# Patient Record
Sex: FEMALE | Race: WHITE | NOT HISPANIC OR LATINO | Employment: UNEMPLOYED | URBAN - METROPOLITAN AREA
[De-identification: names, ages, dates, MRNs, and addresses within clinical notes are randomized per-mention and may not be internally consistent; named-entity substitution may affect disease eponyms.]

---

## 2019-02-06 ENCOUNTER — HOSPITAL ENCOUNTER (EMERGENCY)
Facility: HOSPITAL | Age: 34
Discharge: HOME | End: 2019-02-06
Attending: EMERGENCY MEDICINE
Payer: MEDICAID

## 2019-02-06 VITALS
HEART RATE: 83 BPM | DIASTOLIC BLOOD PRESSURE: 66 MMHG | RESPIRATION RATE: 16 BRPM | SYSTOLIC BLOOD PRESSURE: 113 MMHG | BODY MASS INDEX: 34.16 KG/M2 | WEIGHT: 205 LBS | OXYGEN SATURATION: 97 % | TEMPERATURE: 99 F | HEIGHT: 65 IN

## 2019-02-06 DIAGNOSIS — R11.2 NON-INTRACTABLE VOMITING WITH NAUSEA, UNSPECIFIED VOMITING TYPE: ICD-10-CM

## 2019-02-06 DIAGNOSIS — Z34.90 PREGNANCY, UNSPECIFIED GESTATIONAL AGE: Primary | ICD-10-CM

## 2019-02-06 DIAGNOSIS — N76.0 BV (BACTERIAL VAGINOSIS): ICD-10-CM

## 2019-02-06 DIAGNOSIS — B96.89 BV (BACTERIAL VAGINOSIS): ICD-10-CM

## 2019-02-06 LAB
ABO + RH BLD: NORMAL
ALBUMIN SERPL BCP-MCNC: 3.6 G/DL
ALP SERPL-CCNC: 50 U/L
ALT SERPL W/O P-5'-P-CCNC: 22 U/L
AMORPH CRY UR QL COMP ASSIST: NORMAL
ANION GAP SERPL CALC-SCNC: 10 MMOL/L
AST SERPL-CCNC: 19 U/L
B-HCG UR QL: POSITIVE
BACTERIA #/AREA URNS AUTO: NORMAL /HPF
BACTERIA GENITAL QL WET PREP: ABNORMAL
BASOPHILS # BLD AUTO: 0.04 K/UL
BASOPHILS NFR BLD: 0.4 %
BILIRUB SERPL-MCNC: 0.3 MG/DL
BILIRUB UR QL STRIP: NEGATIVE
BUN SERPL-MCNC: 8 MG/DL
CALCIUM SERPL-MCNC: 10.2 MG/DL
CHLORIDE SERPL-SCNC: 105 MMOL/L
CLARITY UR REFRACT.AUTO: ABNORMAL
CLUE CELLS VAG QL WET PREP: ABNORMAL
CO2 SERPL-SCNC: 21 MMOL/L
COLOR UR AUTO: YELLOW
CREAT SERPL-MCNC: 0.6 MG/DL
CTP QC/QA: YES
DIFFERENTIAL METHOD: ABNORMAL
EOSINOPHIL # BLD AUTO: 0.1 K/UL
EOSINOPHIL NFR BLD: 0.7 %
ERYTHROCYTE [DISTWIDTH] IN BLOOD BY AUTOMATED COUNT: 12.4 %
EST. GFR  (AFRICAN AMERICAN): >60 ML/MIN/1.73 M^2
EST. GFR  (NON AFRICAN AMERICAN): >60 ML/MIN/1.73 M^2
FILAMENT FUNGI VAG WET PREP-#/AREA: ABNORMAL
GLUCOSE SERPL-MCNC: 86 MG/DL
GLUCOSE UR QL STRIP: NEGATIVE
HCT VFR BLD AUTO: 37 %
HGB BLD-MCNC: 12.7 G/DL
HGB UR QL STRIP: NEGATIVE
IMM GRANULOCYTES # BLD AUTO: 0.1 K/UL
IMM GRANULOCYTES NFR BLD AUTO: 0.9 %
KETONES UR QL STRIP: NEGATIVE
LEUKOCYTE ESTERASE UR QL STRIP: NEGATIVE
LIPASE SERPL-CCNC: 10 U/L
LYMPHOCYTES # BLD AUTO: 1.2 K/UL
LYMPHOCYTES NFR BLD: 11 %
MCH RBC QN AUTO: 32 PG
MCHC RBC AUTO-ENTMCNC: 34.3 G/DL
MCV RBC AUTO: 93 FL
MICROSCOPIC COMMENT: NORMAL
MONOCYTES # BLD AUTO: 0.5 K/UL
MONOCYTES NFR BLD: 5 %
NEUTROPHILS # BLD AUTO: 8.8 K/UL
NEUTROPHILS NFR BLD: 82 %
NITRITE UR QL STRIP: NEGATIVE
NRBC BLD-RTO: 0 /100 WBC
PH UR STRIP: 7 [PH] (ref 5–8)
PLATELET # BLD AUTO: 191 K/UL
PMV BLD AUTO: 11.3 FL
POTASSIUM SERPL-SCNC: 3.7 MMOL/L
PROT SERPL-MCNC: 7.4 G/DL
PROT UR QL STRIP: NEGATIVE
RBC # BLD AUTO: 3.97 M/UL
RBC #/AREA URNS AUTO: 1 /HPF (ref 0–4)
SODIUM SERPL-SCNC: 136 MMOL/L
SP GR UR STRIP: 1.01 (ref 1–1.03)
SPECIMEN SOURCE: ABNORMAL
SQUAMOUS #/AREA URNS AUTO: 4 /HPF
T VAGINALIS GENITAL QL WET PREP: ABNORMAL
URN SPEC COLLECT METH UR: ABNORMAL
WBC # BLD AUTO: 10.75 K/UL
WBC #/AREA VAG WET PREP: ABNORMAL
YEAST GENITAL QL WET PREP: ABNORMAL

## 2019-02-06 PROCEDURE — 83690 ASSAY OF LIPASE: CPT

## 2019-02-06 PROCEDURE — 25000003 PHARM REV CODE 250: Performed by: EMERGENCY MEDICINE

## 2019-02-06 PROCEDURE — 81001 URINALYSIS AUTO W/SCOPE: CPT

## 2019-02-06 PROCEDURE — 81025 URINE PREGNANCY TEST: CPT | Performed by: EMERGENCY MEDICINE

## 2019-02-06 PROCEDURE — 99283 EMERGENCY DEPT VISIT LOW MDM: CPT | Mod: 25

## 2019-02-06 PROCEDURE — 86901 BLOOD TYPING SEROLOGIC RH(D): CPT

## 2019-02-06 PROCEDURE — 87491 CHLMYD TRACH DNA AMP PROBE: CPT

## 2019-02-06 PROCEDURE — 99284 EMERGENCY DEPT VISIT MOD MDM: CPT | Mod: ,,, | Performed by: EMERGENCY MEDICINE

## 2019-02-06 PROCEDURE — 87210 SMEAR WET MOUNT SALINE/INK: CPT

## 2019-02-06 PROCEDURE — 99284 PR EMERGENCY DEPT VISIT,LEVEL IV: ICD-10-PCS | Mod: ,,, | Performed by: EMERGENCY MEDICINE

## 2019-02-06 PROCEDURE — 80053 COMPREHEN METABOLIC PANEL: CPT

## 2019-02-06 PROCEDURE — 85025 COMPLETE CBC W/AUTO DIFF WBC: CPT

## 2019-02-06 RX ORDER — LEVOTHYROXINE SODIUM 200 UG/1
200 TABLET ORAL DAILY
Qty: 14 TABLET | Refills: 0 | Status: SHIPPED | OUTPATIENT
Start: 2019-02-06 | End: 2019-02-20

## 2019-02-06 RX ORDER — CLINDAMYCIN PHOSPHATE 20 MG/G
CREAM VAGINAL NIGHTLY
Qty: 40 G | Refills: 0 | Status: SHIPPED | OUTPATIENT
Start: 2019-02-06

## 2019-02-06 RX ORDER — PREGABALIN 225 MG/1
225 CAPSULE ORAL 2 TIMES DAILY
COMMUNITY

## 2019-02-06 RX ORDER — LEVOTHYROXINE SODIUM 200 UG/1
200 TABLET ORAL DAILY
COMMUNITY

## 2019-02-06 RX ORDER — PREGABALIN 75 MG/1
225 CAPSULE ORAL
Status: COMPLETED | OUTPATIENT
Start: 2019-02-06 | End: 2019-02-06

## 2019-02-06 RX ORDER — PREGABALIN 225 MG/1
225 CAPSULE ORAL DAILY
Qty: 7 CAPSULE | Refills: 0 | Status: SHIPPED | OUTPATIENT
Start: 2019-02-06 | End: 2019-02-13

## 2019-02-06 RX ORDER — DOXYLAMINE SUCCINATE AND PYRIDOXINE HYDROCHLORIDE, DELAYED RELEASE TABLETS 10 MG/10 MG 10; 10 MG/1; MG/1
2 TABLET, DELAYED RELEASE ORAL NIGHTLY
Qty: 20 TABLET | Refills: 0 | Status: SHIPPED | OUTPATIENT
Start: 2019-02-06

## 2019-02-06 RX ADMIN — LEVOTHYROXINE SODIUM 200 MCG: 0.11 TABLET ORAL at 10:02

## 2019-02-06 RX ADMIN — PREGABALIN 225 MG: 75 CAPSULE ORAL at 10:02

## 2019-02-06 RX ADMIN — SODIUM CHLORIDE 1000 ML: 0.9 INJECTION, SOLUTION INTRAVENOUS at 10:02

## 2019-02-06 NOTE — ED NOTES
Patient arrives with complaints of morning sickness x 3 days - last meal this morning caused emesis - last meal without emesis was 4 days ago - nausea consistent x 3 days - low abdominal pain and lower back pain - denies vaginal discharge or pain on urination -

## 2019-02-06 NOTE — ED PROVIDER NOTES
"Encounter Date: 2019    SCRIBE #1 NOTE: I, Alfredito Sahu, am scribing for, and in the presence of,  Dr. Montez. I have scribed the following portions of the note - Other sections scribed: HPI, ROS, PE, MDM.       History     Chief Complaint   Patient presents with    Abdominal Pain     Pt c/o abdominal pain & vomiting since Saturday.  Pt states she is 4 months pregnant-has not seen OB yet.     32 yo F with PMHx of hypothyroidism, chronic back pain,  at 14-0 wga by LMP presents with chief complaint of nausea.  Patient reports nausea x4 days. There has been associated vomiting. Last episode at 8AM today. She previously did have scant streaks of blood in her vomit but none today.   She reports no issues with N/V earlier this pregnancy.  Patient also reports occasional lower abdominal pain. It is described as "sharp, cramping". None at this time.  She denies having contractions or having history of pancreatitis.  Patient is concerned about being out of home medications including Lyrica and Synthroid for the past 3-4 days.  She reports vaginal discharge but none more than usual.  Denies vaginal pain, dysuria, urinary frequency.  Of note, [place], she is visiting  who is here for work.  LNMP was on .        The history is provided by the patient and medical records.     Review of patient's allergies indicates:   Allergen Reactions    Pcn [penicillins] Hives and Itching     History reviewed. No pertinent past medical history.  No past surgical history on file.  History reviewed. No pertinent family history.  Social History     Tobacco Use    Smoking status: Not on file   Substance Use Topics    Alcohol use: Not on file    Drug use: Not on file     Review of Systems   Constitutional: Negative for fever.   HENT: Negative for sore throat.    Respiratory: Negative for shortness of breath.    Cardiovascular: Negative for chest pain.   Gastrointestinal: " Positive for nausea and vomiting.   Genitourinary: Negative for dysuria, frequency, vaginal discharge and vaginal pain.   Musculoskeletal: Negative for back pain.   Skin: Negative for rash.   Neurological: Negative for weakness.   Hematological: Does not bruise/bleed easily.   All other systems reviewed and are negative.      Physical Exam     Initial Vitals [02/06/19 0851]   BP Pulse Resp Temp SpO2   (!) 146/79 97 18 98 °F (36.7 °C) 98 %      MAP       --         Physical Exam    Nursing note and vitals reviewed.  Constitutional: She appears well-developed and well-nourished. She is not diaphoretic. No distress.   HENT:   Head: Normocephalic and atraumatic.   Mouth/Throat: Oropharynx is clear and moist.   Eyes: Conjunctivae and EOM are normal.   Neck: Normal range of motion. Neck supple. No JVD present.   Cardiovascular: Normal rate, regular rhythm, normal heart sounds and intact distal pulses. Exam reveals no gallop and no friction rub.    No murmur heard.  Pulmonary/Chest: Breath sounds normal. No respiratory distress. She has no wheezes. She has no rhonchi. She has no rales. She exhibits no tenderness.   Abdominal: Soft. Bowel sounds are normal. She exhibits no distension and no mass. There is no tenderness. There is no rebound, no guarding and no CVA tenderness.   Gravid   Genitourinary: Right adnexum displays no tenderness and no fullness. Left adnexum displays no tenderness and no fullness. Vaginal discharge (scant) found.   Genitourinary Comments: Cervix is closed     Musculoskeletal: Normal range of motion. She exhibits no tenderness.   Lymphadenopathy:     She has no cervical adenopathy.   Neurological: She is alert and oriented to person, place, and time. She has normal strength. No sensory deficit.   Skin: Skin is warm and dry. Capillary refill takes less than 2 seconds.   Psychiatric: She has a normal mood and affect.         ED Course   Procedures  Labs Reviewed   URINALYSIS, REFLEX TO URINE CULTURE -  Abnormal; Notable for the following components:       Result Value    Appearance, UA Cloudy (*)     All other components within normal limits    Narrative:     Preferred Collection Type->Urine, Clean Catch   CBC W/ AUTO DIFFERENTIAL - Abnormal; Notable for the following components:    RBC 3.97 (*)     MCH 32.0 (*)     Immature Granulocytes 0.9 (*)     Gran # (ANC) 8.8 (*)     Immature Grans (Abs) 0.10 (*)     Gran% 82.0 (*)     Lymph% 11.0 (*)     All other components within normal limits   COMPREHENSIVE METABOLIC PANEL - Abnormal; Notable for the following components:    CO2 21 (*)     Alkaline Phosphatase 50 (*)     All other components within normal limits   VAGINAL SCREEN - Abnormal; Notable for the following components:    Clue Cells, Wet Prep Few (*)     WBC - Vaginal Screen Few (*)     Bacteria - Vaginal Screen Moderate (*)     All other components within normal limits   POCT URINE PREGNANCY - Abnormal; Notable for the following components:    POC Preg Test, Ur Positive (*)     All other components within normal limits   C. TRACHOMATIS/N. GONORRHOEAE BY AMP DNA   LIPASE   URINALYSIS MICROSCOPIC    Narrative:     Preferred Collection Type->Urine, Clean Catch   GROUP & RH          Imaging Results    None          Medical Decision Making:   History:   Old Medical Records: I decided to obtain old medical records.  Initial Assessment:   34 yo F with PMHx of hypothyroidism, chronic back pain,  at 14-0 wga by LMP presents with chief complaint of nausea.   Differential Diagnosis:   My initial differential diagnoses include, but are not limited to: Pregnancy, ectopic pregnancy, threatened , dehydration, electrolyte abnormalities, UTI, infectious issues, hepatitis, pancreatitis.    Clinical Tests:   Lab Tests: Ordered and Reviewed  ED Management:  Bedside ultrasound with active IUP with fetal movement and a heart rate present.  Will obtain serum and urine labs.  Will administer IVF and refill home  medications.  I offered the patient bentyl to be shipped from [facility] but she declined.  Will monitor response to treatment.      Reassessment : Urinalysis negative for infection. Vaginosis screen reveals clue cells.  CBC, CMP, and lipase are WNL.  Pt will be discharged on course of diclegis for nausea and vomiting.  She was able to tolerate PO in the ED.  She will be provided with a course of intravaginal clindomycin for BV.  Refill of home doses of levothyroxine and lyrica.  She has no evidence of severe dehyration.  Nausea and vomiting likely secondary to  pregnancy.  Provided with return precautions.              Scribe Attestation:   Scribe #1: I performed the above scribed service and the documentation accurately describes the services I performed. I attest to the accuracy of the note.    Attending Attestation:           Physician Attestation for Scribe:      Comments: I, Dr. Dmitry Montez, personally performed the services described in this documentation. All medical record entries made by the scribe were at my direction and in my presence.  I have reviewed the chart and agree that the record reflects my personal performance and is accurate and complete. Dmitry Montez MD.  1:54 PM 02/06/2019                 Clinical Impression:   The primary encounter diagnosis was Pregnancy, unspecified gestational age. Diagnoses of Non-intractable vomiting with nausea, unspecified vomiting type and BV (bacterial vaginosis) were also pertinent to this visit.      Disposition:   Disposition: Discharged  Condition: Stable                        Dmitry Montez MD  02/06/19 1354

## 2019-02-07 LAB
C TRACH DNA SPEC QL NAA+PROBE: NOT DETECTED
N GONORRHOEA DNA SPEC QL NAA+PROBE: NOT DETECTED

## 2019-04-18 ENCOUNTER — HOSPITAL ENCOUNTER (EMERGENCY)
Age: 34
Discharge: HOME | End: 2019-04-18
Payer: MEDICAID

## 2019-04-18 ENCOUNTER — APPOINTMENT (OUTPATIENT)
Dept: GENERAL RADIOLOGY | Age: 34
End: 2019-04-18
Payer: MEDICAID

## 2019-04-18 ENCOUNTER — HOSPITAL ENCOUNTER (OUTPATIENT)
Age: 34
Discharge: HOME | End: 2019-04-18
Attending: OBSTETRICS & GYNECOLOGY | Admitting: OBSTETRICS & GYNECOLOGY
Payer: MEDICAID

## 2019-04-18 VITALS
RESPIRATION RATE: 18 BRPM | TEMPERATURE: 97.7 F | HEART RATE: 88 BPM | DIASTOLIC BLOOD PRESSURE: 75 MMHG | SYSTOLIC BLOOD PRESSURE: 119 MMHG

## 2019-04-18 VITALS
HEIGHT: 66 IN | DIASTOLIC BLOOD PRESSURE: 60 MMHG | HEART RATE: 92 BPM | BODY MASS INDEX: 36.16 KG/M2 | SYSTOLIC BLOOD PRESSURE: 111 MMHG | TEMPERATURE: 98 F | WEIGHT: 225 LBS | OXYGEN SATURATION: 99 % | RESPIRATION RATE: 16 BRPM

## 2019-04-18 DIAGNOSIS — M79.89 BILATERAL SWELLING OF FEET: ICD-10-CM

## 2019-04-18 DIAGNOSIS — Z3A.28 28 WEEKS GESTATION OF PREGNANCY: ICD-10-CM

## 2019-04-18 DIAGNOSIS — S93.602A SPRAIN OF LEFT FOOT, INITIAL ENCOUNTER: Primary | ICD-10-CM

## 2019-04-18 PROBLEM — M79.672 FOOT PAIN, LEFT: Status: ACTIVE | Noted: 2019-04-18

## 2019-04-18 LAB
ALBUMIN SERPL-MCNC: 3.6 G/DL (ref 3.5–5.1)
ALP BLD-CCNC: 63 U/L (ref 38–126)
ALT SERPL-CCNC: 17 U/L (ref 11–66)
ANION GAP SERPL CALCULATED.3IONS-SCNC: 13 MEQ/L (ref 8–16)
AST SERPL-CCNC: 11 U/L (ref 5–40)
BACTERIA: ABNORMAL /HPF
BASOPHILS # BLD: 0.3 %
BASOPHILS ABSOLUTE: 0 THOU/MM3 (ref 0–0.1)
BILIRUB SERPL-MCNC: < 0.2 MG/DL (ref 0.3–1.2)
BILIRUBIN DIRECT: < 0.2 MG/DL (ref 0–0.3)
BILIRUBIN URINE: NEGATIVE
BLOOD, URINE: ABNORMAL
BUN BLDV-MCNC: 6 MG/DL (ref 7–22)
CALCIUM SERPL-MCNC: 9 MG/DL (ref 8.5–10.5)
CASTS 2: ABNORMAL /LPF
CASTS UA: ABNORMAL /LPF
CHARACTER, URINE: CLEAR
CHLORIDE BLD-SCNC: 105 MEQ/L (ref 98–111)
CO2: 19 MEQ/L (ref 23–33)
COLOR: YELLOW
CREAT SERPL-MCNC: 0.4 MG/DL (ref 0.4–1.2)
CRYSTALS, UA: ABNORMAL
EOSINOPHIL # BLD: 0.7 %
EOSINOPHILS ABSOLUTE: 0.1 THOU/MM3 (ref 0–0.4)
EPITHELIAL CELLS, UA: ABNORMAL /HPF
ERYTHROCYTE [DISTWIDTH] IN BLOOD BY AUTOMATED COUNT: 12.7 % (ref 11.5–14.5)
ERYTHROCYTE [DISTWIDTH] IN BLOOD BY AUTOMATED COUNT: 43.1 FL (ref 35–45)
GFR SERPL CREATININE-BSD FRML MDRD: > 90 ML/MIN/1.73M2
GLUCOSE BLD-MCNC: 103 MG/DL (ref 70–108)
GLUCOSE URINE: NEGATIVE MG/DL
HCT VFR BLD CALC: 32.8 % (ref 37–47)
HEMOGLOBIN: 11.3 GM/DL (ref 12–16)
IMMATURE GRANS (ABS): 0.25 THOU/MM3 (ref 0–0.07)
IMMATURE GRANULOCYTES: 2.1 %
KETONES, URINE: NEGATIVE
LEUKOCYTE ESTERASE, URINE: NEGATIVE
LYMPHOCYTES # BLD: 11.2 %
LYMPHOCYTES ABSOLUTE: 1.3 THOU/MM3 (ref 1–4.8)
MCH RBC QN AUTO: 32.1 PG (ref 26–33)
MCHC RBC AUTO-ENTMCNC: 34.5 GM/DL (ref 32.2–35.5)
MCV RBC AUTO: 93.2 FL (ref 81–99)
MISCELLANEOUS 2: ABNORMAL
MONOCYTES # BLD: 4.9 %
MONOCYTES ABSOLUTE: 0.6 THOU/MM3 (ref 0.4–1.3)
NITRITE, URINE: NEGATIVE
NUCLEATED RED BLOOD CELLS: 0 /100 WBC
OSMOLALITY CALCULATION: 271.7 MOSMOL/KG (ref 275–300)
PH UA: 7 (ref 5–9)
PLATELET # BLD: 216 THOU/MM3 (ref 130–400)
PMV BLD AUTO: 10.5 FL (ref 9.4–12.4)
POTASSIUM SERPL-SCNC: 3.5 MEQ/L (ref 3.5–5.2)
PROTEIN UA: ABNORMAL
RBC # BLD: 3.52 MILL/MM3 (ref 4.2–5.4)
RBC URINE: ABNORMAL /HPF
RENAL EPITHELIAL, UA: ABNORMAL
SEG NEUTROPHILS: 80.8 %
SEGMENTED NEUTROPHILS ABSOLUTE COUNT: 9.5 THOU/MM3 (ref 1.8–7.7)
SODIUM BLD-SCNC: 137 MEQ/L (ref 135–145)
SPECIFIC GRAVITY, URINE: 1.02 (ref 1–1.03)
TOTAL PROTEIN: 6.5 G/DL (ref 6.1–8)
UROBILINOGEN, URINE: 0.2 EU/DL (ref 0–1)
WBC # BLD: 11.7 THOU/MM3 (ref 4.8–10.8)
WBC UA: ABNORMAL /HPF
YEAST: ABNORMAL

## 2019-04-18 PROCEDURE — 2709999900 HC NON-CHARGEABLE SUPPLY

## 2019-04-18 PROCEDURE — 73630 X-RAY EXAM OF FOOT: CPT

## 2019-04-18 PROCEDURE — 99284 EMERGENCY DEPT VISIT MOD MDM: CPT

## 2019-04-18 PROCEDURE — 80053 COMPREHEN METABOLIC PANEL: CPT

## 2019-04-18 PROCEDURE — 82248 BILIRUBIN DIRECT: CPT

## 2019-04-18 PROCEDURE — 36415 COLL VENOUS BLD VENIPUNCTURE: CPT

## 2019-04-18 PROCEDURE — 85025 COMPLETE CBC W/AUTO DIFF WBC: CPT

## 2019-04-18 PROCEDURE — 81001 URINALYSIS AUTO W/SCOPE: CPT

## 2019-04-18 PROCEDURE — 59025 FETAL NON-STRESS TEST: CPT

## 2019-04-18 RX ORDER — DOXYLAMINE SUCCINATE AND PYRIDOXINE HYDROCHLORIDE, DELAYED RELEASE TABLETS 10 MG/10 MG 10; 10 MG/1; MG/1
TABLET, DELAYED RELEASE ORAL
COMMUNITY

## 2019-04-18 RX ORDER — LEVOTHYROXINE SODIUM 0.2 MG/1
200 TABLET ORAL DAILY
COMMUNITY

## 2019-04-18 ASSESSMENT — PAIN DESCRIPTION - DESCRIPTORS: DESCRIPTORS: PRESSURE;SHARP

## 2019-04-18 ASSESSMENT — ENCOUNTER SYMPTOMS
BACK PAIN: 0
SHORTNESS OF BREATH: 0

## 2019-04-18 ASSESSMENT — PAIN DESCRIPTION - LOCATION: LOCATION: FOOT

## 2019-04-18 ASSESSMENT — PAIN DESCRIPTION - PROGRESSION: CLINICAL_PROGRESSION: GRADUALLY WORSENING

## 2019-04-18 ASSESSMENT — PAIN DESCRIPTION - ORIENTATION: ORIENTATION: LEFT

## 2019-04-18 ASSESSMENT — PAIN DESCRIPTION - FREQUENCY: FREQUENCY: CONTINUOUS

## 2019-04-18 ASSESSMENT — PAIN SCALES - GENERAL: PAINLEVEL_OUTOF10: 8

## 2019-04-18 ASSESSMENT — PAIN DESCRIPTION - ONSET: ONSET: ON-GOING

## 2019-04-18 NOTE — ED TRIAGE NOTES
Patient arrived to room 30 with c/o left foot pain. Patient stated this started about 36 hours ago. Patient stated she was cleared by OB. Patient stated yesterday she stepped out her Denae Fly and heard a pop in her left foot. Patient she has pain across her toes and swelling.

## 2019-04-18 NOTE — CONSULTS
Podiatric Surgery Consult    Reason for Consult:  Left foot pain  Requesting Provider:  Misti Mckay    CHIEF COMPLAINT:  Left foot pain    HISTORY OF PRESENT ILLNESS:                The patient is a 26 y.o. female who is 7 months pregnant presents to the emergency room with a chief complaint of left foot pain. She states that she was getting out of her Joe [place] yesterday, and the foot twisted awkwardly and she felt immediate pain to the left foot around the ball of the foot. She states that several years ago she had a similar injury to that foot, that was never formally treated, and this current injury aggravated the previous one. This is the patients first pregnancy, and there have been no complications to date. Past Medical History:        Diagnosis Date    Fibromyalgia     Hernia, hiatal     Hypothyroidism      Past Surgical History:        Procedure Laterality Date    DENTAL SURGERY       Current Medications:    No current facility-administered medications for this encounter. Allergies:  Penicillins and Codeine    Social History:    Smokes 1 pack of cigarrettes a day, denies alcohol consumption, illicit drug use    Family History:   History reviewed. No pertinent family history. REVIEW OF SYSTEMS:    CONSTITUTIONAL:  negative  PHYSICAL EXAM:      C/V: palpable extremity pulses  LUNGS: non labored breathing  ABD: soft, nontender    Vitals:    BP (!) 140/86   Pulse 96   Temp 98 °F (36.7 °C) (Oral)   Resp 16   Ht 5' 6\" (1.676 m)   Wt 225 lb (102.1 kg)   SpO2 100%   BMI 36.32 kg/m²     Exam:     Vascular: Dorsalis pedis and posterior tibial pulses are palpable bilaterally. Skin temperature is warm to warm from proximal tibial tuberosity to distal digits. CFT brisk to exposed digits. Nonpitting edema present to bilateral feet left worse than right.  Hair growth present to the digits    Dermatologic:  No open wounds, lesions, or nodules, no fracture blisters or ecchymosis, no tenting of the skin    Neurovascular:   Gross and protective sensation  Is intact to the digits bilaterally    Musculoskeletal:   Muscle strength 5/5 for all plantarflexors, dorsiflexors, inverters and everters examined. Pain with active dorsiflexion and plantarflexion. Pain with palpation of the metatarsal heads dorsally. 4th metatarsal head is the most tender with palpation     XRAY:  Left foot xray  Impression       No fracture or dislocation. LABS:  No results for input(s): WBC, HGB, HCT, PLT in the last 72 hours. No results for input(s): NA, K, CL, CO2, PHOS, BUN, CREATININE in the last 72 hours. Invalid input(s): CA   No results for input(s): PROT, INR, APTT in the last 72 hours. No results for input(s): CKTOTAL, CKMB, CKMBINDEX, TROPONINI in the last 72 hours. Assessment  Principle  Left foot pain    Chronic  Fibromyalgia  hypothyroid    Plan  Patient initially examined and evaluated  Reviewed xrays with the patient  No fracture or dislocation  CAM boot applied   Recommend compression and ice  Podiatry will continue to follow patient    Will follow up with Dr. Magda Davis on 4/26/19. Thank you for the consultation, allowing podiatry to assist in the medical welfare of this patient. Podiatry will continue to follow this patient throughout the duration of hospitalization.    Calixto ROSS [facility]  4/18/2019 3:56 PM

## 2019-04-18 NOTE — ED PROVIDER NOTES
[address] [facility]  Pt Name: Johana Hayes  MRN: [id number]  [place] 1985  Date of evaluation: 4/18/2019  Provider: MEY Heart CNP    CHIEF COMPLAINT       Chief Complaint   Patient presents with    Foot Pain     left       Nurses Notes reviewed and I agree except as noted in the HPI. HISTORY OF PRESENT ILLNESS    Johana Hayes is a 26 y.o. female whopresents to the emergency department from home with left foot pain that began 36 hours ago. Patient explains that she stepped out of her [place] when she felt a pop and cracking within he mid foot. She noticed an increase in foot swelling. Patient is 7 weeks pregnant and cleared by OB. Patient denies fever, chills, weakness, shortness of breath, or chest pain. No further complaints at initial evaluation. Triage notes and Nursing notes were reviewed by myself. Any discrepancies are addressed above. REVIEW OF SYSTEMS     Review of Systems   Constitutional: Negative for chills, fatigue and fever. Eyes: Negative for visual disturbance. Respiratory: Negative for shortness of breath. Cardiovascular: Negative for chest pain, palpitations and leg swelling. Musculoskeletal: Positive for arthralgias and myalgias. Negative for back pain, joint swelling and neck pain. Skin: Negative for pallor and rash. Neurological: Negative for weakness, numbness and headaches. Hematological: Negative for adenopathy. All pertinent systems were reviewed and are negative unless indicated in the HPI. PAST MEDICAL HISTORY    has a past medical history of Fibromyalgia, Hernia, hiatal, and Hypothyroidism. SURGICAL HISTORY      has a past surgical history that includes Dental surgery.     CURRENT MEDICATIONS       Discharge Medication List as of 4/18/2019  5:48 PM      CONTINUE these medications which have NOT CHANGED    Details   levothyroxine (SYNTHROID) 200 MCG tablet Take 200 mcg by mouth DailyHistorical Med      doxylamine-pyridoxine (DICLEGIS) 10-10 MG TBEC Take by mouth Indications: 1 tab at night, if symptoms continue 1 tab at morning and 1 at nightHistorical Med             ALLERGIES     is allergic to penicillins and codeine. FAMILY HISTORY     has no family status information on file. family history is not on file. SOCIAL HISTORY      reports that she has been smoking cigarettes. She has a 15.00 pack-year smoking history. She has never used smokeless tobacco. She reports that she drank alcohol. She reports that she has current or past drug history. PHYSICAL EXAM     INITIAL VITALS:  height is 5' 6\" (1.676 m) and weight is 225 lb (102.1 kg). Her oral temperature is 98 °F (36.7 °C). Her blood pressure is 111/60 and her pulse is 92. Her respiration is 16 and oxygen saturation is 99%. Physical Exam   Constitutional: She is oriented to person, place, and time. She appears well-developed and well-nourished. HENT:   Head: Normocephalic and atraumatic. Right Ear: External ear normal.   Left Ear: External ear normal.   Eyes: Conjunctivae are normal. Right eye exhibits no discharge. Left eye exhibits no discharge. No scleral icterus. Neck: Normal range of motion. Neck supple. No JVD present. Pulmonary/Chest: Effort normal. No stridor. No respiratory distress. Abdominal: Soft. She exhibits no distension. Musculoskeletal: Normal range of motion. She exhibits no edema. Tenderness at the 3rd, 4th, and 5th metatarsals of the left foot with more proximal tenderness at the 5th metatarsal.    Neurological: She is alert and oriented to person, place, and time. She exhibits normal muscle tone. GCS eye subscore is 4. GCS verbal subscore is 5. GCS motor subscore is 6. Skin: Skin is warm and dry. She is not diaphoretic. No erythema. Psychiatric: She has a normal mood and affect. Her behavior is normal.   Nursing note and vitals reviewed.         DIFFERENTIAL DIAGNOSIS:   Including but not limited to fracture, sprain, strain, soft Labs & Imaging studies reviewed. (See chart for details)         The patient was seen and evaluated in atimely manner for left foot pain after stepping out of her Marianna jodie and hearing a \"pop\". Her vital signs were stable. During the physical exam I noted heart and lungs clear to ascultation. Tenderness at the 3rd, 4th, and 5th metatarsals of the left foot with more proximal tenderness at the 5th metatarsal. I ordered appropriate labs and XR left foot. XR left foot revealed no fracture or dislocation. Laboratory and imaging results were reviewed and discussed with the patient. Dr. Aishwarya Patino (Podiatry) went into the patient's room and evaluated the patient. He agreed with plan of care. Was placed in a foot. Per Dr. Miriam Tesfaye. I also dee labs secondary to the patient's slightly elevated blood pressure readings and the swelling. The patient was just seen up in OB and cleared from that aspect. I did discuss this with Dr. Devante Edwards And he agrees with discharge. I did discuss with the patient that she needs to establish obstetrical care and follow through with the same OB until delivery. Patient indicated that they move around a lot and that she has seen OBs in [place] and [place] [id number] and she is looking at moving to [facility]. Strict return precautions and follow up instructions were discussed with the patient with which the patient agrees     Physical assessment findings, diagnostic testing(s) if applicable, and vital signs reviewed with patient/patient representative. Questions answered. Medications asdirected, including OTC medications for supportive care. Education provided on medications. Differential diagnosis(s) discussed with patient/patient representative. Home care/self care instructions reviewed withpatient/patient representative. Patient is to follow-up with family care provider in 2-3 days if no improvement. Patient is to go to the emergency department if symptoms worsen.   Patient/patient representative isaware of care plan, questions answered, verbalizes understanding and is in agreement. ED Medications administered this visit: Medications - No data to display        I have given the patient strict written and verbal instructions about care at home,follow-up, and signs and symptoms of worsening of condition and they did verbalize understanding. Patient was seen independently by myself. The Patient's final impression and disposition and plan was determined by myself. CRITICAL CARE:   None    CONSULTS:  Dr. Mimi Trimble (Podiatry)    PROCEDURES:  None    FINAL IMPRESSION      1. Sprain of left foot, initial encounter    2. 28 weeks gestation of pregnancy    3. Bilateral swelling of feet          DISPOSITION/PLAN   DISPOSITION Decision To Discharge 04/18/2019 05:47:07 PM        PATIENT REFERRED TO:  CHAY Espinoza  [address] [facility] [place] [address]  [phone]    Schedule an appointment as soon as possible for a visit in 1 day  Left foot pain follow-up 4/26/19    DR. Zari Urban OF [place] [address] [facility] [place] [address] [phone]    Schedule an appointment as soon as possible for a visit in 2 days  For follow up    Alison Espinoza [address] [place] [id number]  [phone]    Call in 1 day  For follow up on [address]:  Discharge Medication List as of 4/18/2019  5:48 PM          (Please note that portions of this note were completed with a voice recognition program.  Efforts were made to edit thedictations but occasionally words are mis-transcribed.)    MEY Connor - CNP        Scribe:  Christiana Moss(Name) 4/18/19 3:51 PM Scribing for and in the presence of Get Fearing CNP.     Signed by: Christiana Moss(Name), Romaine, 04/18/19 8:47 PM    Provider:  I personally performed the services described in the documentation, reviewed and edited the documentation which was dictated to the scribe in my presence, and it accurately records my words and actions.     Jose Maria Nava CNP 04/18/19 8:47 PM           MEY Dan CNP  04/18/19 2049

## 2019-04-18 NOTE — FLOWSHEET NOTE
Pt here with c/o left foot pain and bilateral foot swelling. Pt states she hurt her left foot approx 7 years ago, and then last night she felt a pop when she stepped on it wrong. Pt denies any obstetrical complaints. Also states she would like a refill of her lyrica. Dr Scottie Garcia here on unit. Informed pt does not have a doctor around here and her c/o's. States to get an NST and return to ER for evaluation of foot pain and swelling.

## 2021-07-25 ENCOUNTER — HOSPITAL ENCOUNTER (EMERGENCY)
Facility: CLINIC | Age: 36
Discharge: HOME | End: 2021-07-25
Attending: NURSE PRACTITIONER | Admitting: NURSE PRACTITIONER
Payer: MEDICAID

## 2021-07-25 VITALS
OXYGEN SATURATION: 99 % | RESPIRATION RATE: 16 BRPM | HEART RATE: 89 BPM | SYSTOLIC BLOOD PRESSURE: 140 MMHG | DIASTOLIC BLOOD PRESSURE: 76 MMHG | TEMPERATURE: 98.7 F

## 2021-07-25 DIAGNOSIS — Z76.0 ENCOUNTER FOR MEDICATION REFILL: ICD-10-CM

## 2021-07-25 PROBLEM — M79.7 FIBROMYALGIA: Status: ACTIVE | Noted: 2019-07-27

## 2021-07-25 PROBLEM — E06.3 HYPOTHYROIDISM DUE TO HASHIMOTO'S THYROIDITIS: Status: ACTIVE | Noted: 2021-05-12

## 2021-07-25 PROBLEM — G43.109 MIGRAINE HEADACHE WITH AURA: Status: ACTIVE | Noted: 2019-07-27

## 2021-07-25 PROBLEM — M99.06 SOMATIC DYSFUNCTION OF BOTH LOWER EXTREMITIES: Status: ACTIVE | Noted: 2021-05-12

## 2021-07-25 PROBLEM — M99.03 SOMATIC DYSFUNCTION OF SPINE, LUMBAR: Status: ACTIVE | Noted: 2021-07-25

## 2021-07-25 PROBLEM — F17.200 SMOKING: Status: ACTIVE | Noted: 2020-07-10

## 2021-07-25 PROBLEM — F90.9 ADHD: Status: ACTIVE | Noted: 2019-07-27

## 2021-07-25 PROBLEM — F12.90 MARIJUANA USE: Status: ACTIVE | Noted: 2019-07-30

## 2021-07-25 PROCEDURE — 99282 EMERGENCY DEPT VISIT SF MDM: CPT

## 2021-07-25 RX ORDER — PREGABALIN 200 MG/1
200 CAPSULE ORAL
COMMUNITY
Start: 2021-06-25 | End: 2021-07-25

## 2021-07-25 RX ORDER — PREGABALIN 200 MG/1
200 CAPSULE ORAL 3 TIMES DAILY
Qty: 12 CAPSULE | Refills: 0 | Status: SHIPPED | OUTPATIENT
Start: 2021-07-25 | End: 2021-07-29

## 2021-07-25 RX ORDER — ONDANSETRON 4 MG/1
4 TABLET, FILM COATED ORAL EVERY 8 HOURS PRN
Qty: 12 TABLET | Refills: 0 | Status: SHIPPED | OUTPATIENT
Start: 2021-07-25 | End: 2021-07-29

## 2021-07-25 RX ORDER — ONDANSETRON 4 MG/1
TABLET, FILM COATED ORAL
COMMUNITY
Start: 2020-11-11 | End: 2021-07-25

## 2021-07-25 ASSESSMENT — ENCOUNTER SYMPTOMS
ABDOMINAL PAIN: 0
SHORTNESS OF BREATH: 0
CHILLS: 0
FEVER: 0

## 2021-07-25 NOTE — ED PROVIDER NOTES
History     Chief Complaint:  Medication Refill      HPI   Janett Wilson is a 36 year old female who presents with request for refill of her Lyrica and Zofran.  She states that she is from [place] here with her  for his work.  She attempted to get her medication refilled prior to leaving [place].  However she states that they were unable to send in her refill requests prior to her leaving [place] and was unable to get the prescription transferred from her normal pharmacy to one here as it is the weekend.  She has no other concerns or complaints.    Review of Systems   Constitutional: Negative for chills and fever.   Respiratory: Negative for shortness of breath.    Cardiovascular: Negative for chest pain.   Gastrointestinal: Negative for abdominal pain.   All other systems reviewed and are negative.      Allergies:  Codeine  Penicillins  Gabapentin      Medications:    ondansetron (ZOFRAN) 4 MG tablet  Pregabalin (LYRICA) 200 MG capsule        Past Medical History:      History reviewed. No pertinent past medical history.  Patient Active Problem List    Diagnosis Date Noted     Somatic dysfunction of spine, lumbar 07/25/2021     Priority: Medium     Hypothyroidism due to Hashimoto's thyroiditis 05/12/2021     Priority: Medium     Formatting of this note might be different from the original.  Started at age 18     Last Assessment & Plan:   Formatting of this note might be different from the original.  Decrease synthroid to 150 daily instead of 200. In view of slightly elevated TSH       Somatic dysfunction of both lower extremities 05/12/2021     Priority: Medium     Smoking 07/10/2020     Priority: Medium     Last Assessment & Plan:   Formatting of this note might be different from the original.    Smokes 1-1.5 PPD    Wheezing heard on physical exam    We discussed with her the importance of quitting smoking to avoid progression to COPD and other complications    She was willing to try quitting    She  has unsuccessfully tried to quit with nicotine patches before    We will prescribe her Chantix and follow up in 5 weeks    She received PPSV 23 vaccine in the clinic today  Formatting of this note might be different from the original.  Last Assessment & Plan:   Formatting of this note might be different from the original.    Smokes 1-1.5 PPD    Wheezing heard on physical exam    We discussed with her the importance of quitting smoking to avoid progression to COPD and other complications    She was willing to try quitting    She has unsuccessfully tried to quit with nicotine patches before    We will prescribe her Chantix and follow up in 5 weeks    She received PPSV 23 vaccine in the clinic today    Last Assessment & Plan:   Formatting of this note might be different from the original.    Smokes 1-1.5 PPD    She is willing to try quitting    She has unsuccessfully tried to quit with nicotine patches before    We will prescribe her Wellbutrin 150 mg/day for 3 days, then 150 mg twice a day for 3 months. Will gradually stop program; Reduce smoking by half on July 15 then another half by Aug 15 and stop September 11.  Discussion less than 10 minutes       Marijuana use 07/30/2019     Priority: Medium     ADHD 07/27/2019     Priority: Medium     Last Assessment & Plan:   Formatting of this note might be different from the original.    Ambulatory referral to psych to assess the need for stimulant medications  Formatting of this note might be different from the original.  Last Assessment & Plan:   Formatting of this note might be different from the original.    Ambulatory referral to psych to assess the need for stimulant medications       Fibromyalgia 07/27/2019     Priority: Medium     Last Assessment & Plan:   Formatting of this note might be different from the original.    History of fibromyalgia managed with Lyrica,     We checked her MAPS and it was consistent with her story    She has a history of substance abuse  and is currently still using marijuana, we will get a UDS and if it is consistent with her story we will prescribe her Lyrica    CSA agreement was signed today  Formatting of this note might be different from the original.  Diagnosed at age 16 when she lived in [place]. Tried multiple meds since then including narcotics: fentanyl patch, dilaudid, tens unit. Finally shifted to Lyrica only for the last 10 years including throughout pregnancy    Last Assessment & Plan:   Formatting of this note might be different from the original.    History of fibromyalgia managed with Lyrica,     We checked her MAPS and it was consistent with her story    She has a history of substance abuse and is currently still using marijuana, we will get a UDS and if it is consistent with her story we will prescribe her Lyrica    CSA agreement was signed today    Last Assessment & Plan:   Formatting of this note might be different from the original.  Would like to go down on Lyrica to 150 tid       Migraine headache with aura 07/27/2019     Priority: Medium        Past Surgical History:    History reviewed. No pertinent surgical history.    Family History:    No family history on file.    Social History:  From out of town  Has child  Here for husbands work    Physical Exam     Patient Vitals for the past 24 hrs:   BP Temp Temp src Pulse Resp SpO2   07/25/21 1459 (!) 140/76 98.7  F (37.1  C) Oral 89 16 99 %       Physical Exam  General: Alert, No obvious discomfort, well kept, obese  HENT:  Normal voice, No lymphadenopathy  Eyes:  The pupils are equal, round, and reactive to light, Conjunctiva normal, No scleral icterus   Neck:  Normal range of motion  CV:  Normal Pulses  Resp:  Non-labored, No cough  MS:  Normal muscular tone, moves all extremities  Skin:  No rash or acute skin lesions noted  Neuro:  Speech is normal and fluent  Psych: Awake. Alert.  Normal affect.  Appropriate interactions. Good eye contact    Emergency Department Course      Imaging:  No orders to display       Emergency Department Course:    Reviewed:  I reviewed nursing notes, vitals, past history and care everywhere    Assessments:   I obtained history and examined the patient as noted above.    I explained to the patient the chronic medication refill policy and the need to get further medication from primary care provider.      Interventions:  Medications - No data to display    Disposition:  The patient was discharged to home.    Impression & Plan      Medical Decision Making:  Janett Wilson is a 36 year old female who presents today with request for medication refill.  She states due to traveling for her 's job she was unable to get her medications refilled prior to leaving their home [facility].  As it was the weekend she was unable to get the prescription transferred here as well.  I have given her 4 days worth of Zofran and Lyrica.  She understands that she will need to follow-up with her primary care provider to get further prescription refills.  She had no other concerns or complaints.  No indication for further evaluation or work-up.  She is discharged home.      Covid-19  Janett Wilson was evaluated during a global COVID-19 pandemic, which necessitated consideration that the patient might be at risk for infection with the SARS-CoV-2 virus that causes COVID-19.   Applicable protocols for evaluation were followed during the patient's care.   COVID-19 was considered as part of the patient's evaluation.  No indication for testing at this time.    Diagnosis:    ICD-10-CM    1. Encounter for medication refill  Z76.0        Discharge Medications:  Current Discharge Medication List             Jatin Ramirez, STEVE CNP  07/25/21 9802